# Patient Record
(demographics unavailable — no encounter records)

---

## 2017-02-08 NOTE — REP
LEFT FOOT SERIES:  FOUR VIEWS.

 

HISTORY:  Trauma.

 

FINDINGS:  Four views of the left foot show a nondisplaced fracture through the

calcaneus best seen on oblique radiograph.  Bohler angle is not flattened.  No

other fracture is seen.  Overall mineralization pattern is normal.

 

IMPRESSION:

 

Nondisplaced fracture of the posterior and plantar aspect of the left calcaneus.

 

 

Signed by

Karlos Vargas MD 02/08/2017 03:02 P

## 2017-02-08 NOTE — EDDOCDS
Nurse's Notes                                                                                     

[facility]                                                                         

Name: Ralph Castillo                                                                           

Age: 50 yrs                                                                                       

Sex: Male                                                                                         

: 1966                                                                                   

MRN: [id number]                                                                                     

Arrival Date: 2017                                                                          

Time: 08:32                                                                                       

Account#: [id number]                                                                              

Bed PD                                                                                      

Private MD:                                                                                       

Diagnosis: Nondisplaced fracture of body of left calcaneus;Low back pain                          

                                                                                                  

Presentation:                                                                                     

                                                                                             

08:56 Presenting complaint: Patient states: "Ladder kicked out and I landed on my heel"       mlb1

      reports falling 10 feet onto left heel last night. Adult Sepsis Screening: The patient      

      does not have new or worsening altered mentation. Patient's respiratory rate is less        

      than 22. Systolic blood pressure is greater than 100. Patient has a qSOFA score of 0-       

      Negative Sepsis Screen. Suicide/Homicide risk assessment- the patient denies having any     

      suicidal and/or homicidal ideations and does not present with any other emotional,          

      behavioral or mental health complaints.  Status: Patient is not a service           

      member or  dependent. Transition of care: patient was not received from another     

      setting of care.                                                                            

08:56 Acuity: MARTHA Level 4                                                                     mlb1

08:56 Method Of Arrival: Walkin/Carried/Asstd                                                 mlb1

                                                                                                  

Triage Assessment:                                                                                

08:58 General: Appears in no apparent distress, Behavior is appropriate for age, cooperative. mlb1

      Pain: Location: heel of left foot Pain currently is 5 out of 10 on a pain scale. At         

      worst was 10 out of 10 on a pain scale. Aggravated by weight bearing. Pt Declines HIV       

      testing.                                                                                    

                                                                                                  

Historical:                                                                                       

- Allergies: no known allergies;                                                                  

- Home Meds:                                                                                      

1. Synthroid 150 mcg Oral tab 1 tab once daily                                                  

- PMHx: Hypothyroidism;                                                                           

- PSHx: Tonsillectomy; Hernia repair;                                                             

- Social history: Smoking status: Patient states was never smoker of tobacco. No                  

barriers to communication noted, The patient speaks fluent English, Speaks                      

appropriately for age.                                                                          

- Family history: Not pertinent.                                                                  

- : The pt / caregiver states he / she is not on anticoagulants. Home medication list             

is obtained from the patient.                                                                   

- Exposure Risk Screening:: None identified.                                                      

                                                                                                  

                                                                                                  

Screening:                                                                                        

10:07 Screening information is obtained from the patient. Fall risk: No risks identified.     mlb1

      Assistance ADL's: requires no assistance with activities of daily living. Abuse/DV          

      Screen: The patient / caregiver reports he/she is: not in a situation that causes fear,     

      pain or injury. Nutritional screening: No deficits noted. Advance Directives:               

      Currently, there is no health care proxy. home support is adequate.                         

                                                                                                  

Assessment:                                                                                       

10:06 General: Appears in no apparent distress, Behavior is agitated. Pain: Location: heel of mlb1

      left foot Pain currently is 5 out of 10 on a pain scale. Injury Description: Bruise         

      sustained to arch of left foot is purple.                                                   

10:07 General: Pt refused back x-ray, reported back pain to provider none reported to this    b1

      writer.                                                                                     

                                                                                                  

Vital Signs:                                                                                      

08:58  / 59; Pulse 60; Resp 16; Temp 98.9(TE); Pulse Ox 98% on R/A; Weight 79.38 kg     mlb1

      (R); Height 6 ft. 0 in. (182.88 cm) (R); Pain 5/10;                                         

08:58 Body Mass Index 23.73 (79.38 kg, 182.88 cm)                                             mlb1

                                                                                                  

Vitals:                                                                                           

08:58 Log In Time: 2017 at 08:30.                                                mlb1

                                                                                                  

ED Course:                                                                                        

08:33 Patient visited by Broderick Keenan.                                                    mm15

08:33 Patient moved to Waiting                                                                mm15

08:56 Patient visited by Oz George, YADIEL.                                               mlb1

08:57 Triage Initiated                                                                        mlb1

08:59 Patient visited by Oz George RN.                                               mlb1

08:59 Patient moved to PD2 /                                                                mlb1

09:03 [facility]                                                        ar2 

09:03 Barin Aguilar MD is Attending Physician.                                               ar2 

09:08 Patient visited by Genaro Vargas PA-C.                                             ar2 

10:04 Pako Koo is Referral Physician.                                                     ar2 

10:08 The patient / caregiver is instructed regarding the plan of care and ED course.         mlb1

10:08 No IV's were initiated during this patient's visit. No procedures done that require     mlb1

      assistance.                                                                                 

10:16 [facility] Payment Agreement was scanned into [employer] and attached to record.               mm15

10:23 Patient visited by Oz George, YADIEL.                                               mlb1

                                                                                                  

Order Results:                                                                                    

There are currently no results for this order.                                                    

Outcome:                                                                                          

10:05 Patient left against medical advice.                                                    ar2 

10:18 Discharge Assessment: Patient awake, alert and oriented x 3. No cognitive and/or        mlb1

      functional deficits noted. Patient verbalized understanding of disposition                  

      instructions. patient administered narcotics - no. Condition: good. Discharge               

      instructions given to patient, Instructed on discharge instructions, follow up and          

      referral plans. Demonstrated understanding of instructions, Pt was receptive of             

      discharge instructions/ teaching. No special radiology studies were completed. Property     

      sent home with patient.                                                                     

10:19 The following High Risk Discharge criteria are identified: Yes, Pt leaving AMA for      mlb1

      refusing back x-ray, reported back pain to provider, appointment with Ortho Dr. Koo       

      immediately after departure. The patient is leaving AMA: AMA form signed, Notification      

      of AMA status is made to the charge nurse, the ED attending physician.                      

10:23 Patient left the ED.                                                                    [facility]

                                                                                                  

Signatures:                                                                                       

Oz George RN                   RN   b1                                                 

Genaro Vargas PA-C PA-C ar2                                                  

Broderick Keenan                             mm15                                                 

                                                                                                  

Corrections: (The following items were deleted from the chart)                                    

10:23 10:18 The following High Risk Discharge criteria are identified: None. Discharged to    [facility]

      home ambulatory, with crutches, [facility]                                                        

10:23 10:18 Discharge instructions given to patient, Instructed on discharge instructions,    b1

      follow up and referral plans. Demonstrated understanding of instructions, Pt was            

      receptive of discharge instructions/ teaching. mlb1                                         

                                                                                                  

**************************************************************************************************

MTDD

## 2017-02-08 NOTE — REP
LEFT OS CALCIS, TWO VIEWS:

 

HISTORY:  Fall.

 

There is a defect in the inferior cortex of the calcaneus consistent with a

fracture.  There is no dislocation.  The joint spaces are normal in appearance.

 

 

IMPRESSION:

 

Fracture of the inferior calcaneus.

 

 

Signed by

Jhon Gomez MD 02/08/2017 09:59 A

## 2017-02-08 NOTE — EDDOCDS
Physician Documentation                                                                           

[facility]                                                                         

Name: Ralph Castillo                                                                           

Age: 50 yrs                                                                                       

Sex: Male                                                                                         

: 1966                                                                                   

MRN: [id number]                                                                                     

Arrival Date: 2017                                                                          

Time: 08:32                                                                                       

Account#: [id number]                                                                              

Bed PD                                                                                      

Private MD:                                                                                       

Disposition:                                                                                      

17 10:05 Patient has left against medical advice. Impression: Nondisplaced fracture of      

body of left calcaneus, Low back pain. - Patients states they are going to                      

Home/Self Care.                                                                                 

- Condition is Unknown.                                                                           

- Discharge Instructions: Calcaneal Fracture Repair, Crutch Use.                                  

                                                                                                  

Medication Reconciliation, Local Pharmacy Hours form.                                             

Follow up: Pako Koo; When: go directly to office; Reason: Recheck today's                     

complaints, Continuance of care.                                                                

- Problem is new.                                                                                 

- Symptoms are unchanged.                                                                         

- Notes: Your case was discussed with Dr. Koo. Use crutches, no weight bearing on               

left foot. Go directly to his office from the ER for further evaluation. Please                 

understand you may also have a fracture in your spine for which you refused an xray             

today.                                                                                          

                                                                                                  

                                                                                                  

Historical:                                                                                       

- Allergies: no known allergies;                                                                  

- Home Meds:                                                                                      

1. Synthroid 150 mcg Oral tab 1 tab once daily                                                  

- PMHx: Hypothyroidism;                                                                           

- PSHx: Tonsillectomy; Hernia repair;                                                             

- Social history: Smoking status: Patient states was never smoker of tobacco. No                  

barriers to communication noted, The patient speaks fluent English, Speaks                      

appropriately for age.                                                                          

- Family history: Not pertinent.                                                                  

- : The pt / caregiver states he / she is not on anticoagulants. Home medication list             

is obtained from the patient.                                                                   

- Exposure Risk Screening:: None identified.                                                      

                                                                                                  

                                                                                                  

Vital Signs:                                                                                      

                                                                                             

08:58  / 59; Pulse 60; Resp 16; Temp 98.9(TE); Pulse Ox 98% on R/A; Weight 79.38 kg /   mlb1

      175 lbs (R); Height 6 ft. 0 in. (182.88 cm) (R); Pain 5/10;                                 

08:58 Body Mass Index 23.73 (79.38 kg, 182.88 cm)                                             mlb1

                                                                                                  

MDM:                                                                                              

09:16 Foot, Complete Ordered.                                                                 EDMS

09:16 Calcaneus Ordered.                                                                      EDMS

09:16 Spine. Lumbosacral, Complete Ordered.                                                   EDMS

09:16 NOTHING BY MOUTH+DIET ordered.                                                          EDMS

09:37 Financial registration complete.                                                        mm15

10:16 [facility] Payment Agreement was scanned into [employer] and attached to record.               mm15

                                                                                                  

Signatures:                                                                                       

Dispatcher [employer]                           Oz Talavera RN                   RN   mlb1                                                 

Genaro Vargas PA-C PA-C ar2                                                  

Broderick Keenan                             mm15                                                 

                                                                                                  

The chart was reviewed and I authenticate all verbal orders and agree with the evaluation and 
treatment provided.Attachments:

10:16 [facility] Payment Agreement                                                                mm15

                                                                                                  

**************************************************************************************************

MTDD

## 2017-02-10 NOTE — EDDOCDS
Physician Documentation                                                                           

[facility]                                                                         

Name: Ralph Castillo                                                                           

Age: 50 yrs                                                                                       

Sex: Male                                                                                         

: 1966                                                                                   

MRN: [id number]                                                                                     

Arrival Date: 2017                                                                          

Time: 08:32                                                                                       

Account#: [id number]                                                                              

Bed PD                                                                                      

Private MD:                                                                                       

Disposition:                                                                                      

17 10:05 Patient has left against medical advice. Impression: Nondisplaced fracture of      

body of left calcaneus, Low back pain. - Patients states they are going to                      

Home/Self Care.                                                                                 

- Condition is Unknown.                                                                           

- Discharge Instructions: Calcaneal Fracture Repair, Crutch Use.                                  

                                                                                                  

Medication Reconciliation, Local Pharmacy Hours form.                                             

Follow up: Pako Koo; When: go directly to office; Reason: Recheck today's                     

complaints, Continuance of care.                                                                

- Problem is new.                                                                                 

- Symptoms are unchanged.                                                                         

- Notes: Your case was discussed with Dr. Koo. Use crutches, no weight bearing on               

left foot. Go directly to his office from the ER for further evaluation. Please                 

understand you may also have a fracture in your spine for which you refused an xray             

today.                                                                                          

                                                                                                  

                                                                                                  

Historical:                                                                                       

- Allergies: no known allergies;                                                                  

- Home Meds:                                                                                      

1. Synthroid 150 mcg Oral tab 1 tab once daily                                                  

- PMHx: Hypothyroidism;                                                                           

- PSHx: Tonsillectomy; Hernia repair;                                                             

- Social history: Smoking status: Patient states was never smoker of tobacco. No                  

barriers to communication noted, The patient speaks fluent English, Speaks                      

appropriately for age.                                                                          

- Family history: Not pertinent.                                                                  

- : The pt / caregiver states he / she is not on anticoagulants. Home medication list             

is obtained from the patient.                                                                   

- Exposure Risk Screening:: None identified.                                                      

                                                                                                  

                                                                                                  

Vital Signs:                                                                                      

                                                                                             

08:58  / 59; Pulse 60; Resp 16; Temp 98.9(TE); Pulse Ox 98% on R/A; Weight 79.38 kg /   mlb1

      175 lbs (R); Height 6 ft. 0 in. (182.88 cm) (R); Pain 5/10;                                 

08:58 Body Mass Index 23.73 (79.38 kg, 182.88 cm)                                             mlb1

                                                                                                  

MDM:                                                                                              

09:16 Foot, Complete Ordered.                                                                 EDMS

09:16 Calcaneus Ordered.                                                                      EDMS

09:16 Spine. Lumbosacral, Complete Ordered.                                                   EDMS

09:16 NOTHING BY MOUTH+DIET ordered.                                                          EDMS

09:37 Financial registration complete.                                                        mm15

10:16 [facility] Payment Agreement was scanned into [employer] and attached to record.               mm15

                                                                                             

10:10 Refusal of Services was scanned into [employer] and attached to record.                    gb  

10:10 T-Sheet-- Draft Copy was scanned into [employer] and attached to record.                   gb  

10:10 Radiology Report was scanned into [employer] and attached to record.                       gb  

                                                                                                  

Signatures:                                                                                       

Dispatcher [employer]                           EDMS                                                 

Rosario Troncoso, Reg                  Reg  gb                                                   

Oz George RN                   RN   mlb1                                                 

Genaro Vargas PA-C                 PAELSIE ar2                                                  

Broderick Keenan                             mm15                                                 

                                                                                                  

The chart was reviewed and I authenticate all verbal orders and agree with the evaluation and 
treatment provided.Attachments:

                                                                                             

10:16 [facility] Payment Agreement                                                                mm15

10:10 T-Sheet-- Draft Copy                                                                    gb  

                                                                                                  

**************************************************************************************************



*** Chart Complete ***
MTDD

## 2017-02-10 NOTE — EDDOCDS
Physician Documentation                                                                           

[facility]                                                                         

Name: Ralph Castillo                                                                           

Age: 50 yrs                                                                                       

Sex: Male                                                                                         

: 1966                                                                                   

MRN: [id number]                                                                                     

Arrival Date: 2017                                                                          

Time: 08:32                                                                                       

Account#: [id number]                                                                              

Bed PD                                                                                      

Private MD:                                                                                       

Disposition:                                                                                      

17 10:05 Patient has left against medical advice. Impression: Nondisplaced fracture of      

body of left calcaneus, Low back pain. - Patients states they are going to                      

Home/Self Care.                                                                                 

- Condition is Unknown.                                                                           

- Discharge Instructions: Calcaneal Fracture Repair, Crutch Use.                                  

                                                                                                  

Medication Reconciliation, Local Pharmacy Hours form.                                             

Follow up: Pako Koo; When: go directly to office; Reason: Recheck today's                     

complaints, Continuance of care.                                                                

- Problem is new.                                                                                 

- Symptoms are unchanged.                                                                         

- Notes: Your case was discussed with Dr. Koo. Use crutches, no weight bearing on               

left foot. Go directly to his office from the ER for further evaluation. Please                 

understand you may also have a fracture in your spine for which you refused an xray             

today.                                                                                          

                                                                                                  

                                                                                                  

Historical:                                                                                       

- Allergies: no known allergies;                                                                  

- Home Meds:                                                                                      

1. Synthroid 150 mcg Oral tab 1 tab once daily                                                  

- PMHx: Hypothyroidism;                                                                           

- PSHx: Tonsillectomy; Hernia repair;                                                             

- Social history: Smoking status: Patient states was never smoker of tobacco. No                  

barriers to communication noted, The patient speaks fluent English, Speaks                      

appropriately for age.                                                                          

- Family history: Not pertinent.                                                                  

- : The pt / caregiver states he / she is not on anticoagulants. Home medication list             

is obtained from the patient.                                                                   

- Exposure Risk Screening:: None identified.                                                      

                                                                                                  

                                                                                                  

Vital Signs:                                                                                      

                                                                                             

08:58  / 59; Pulse 60; Resp 16; Temp 98.9(TE); Pulse Ox 98% on R/A; Weight 79.38 kg /   mlb1

      175 lbs (R); Height 6 ft. 0 in. (182.88 cm) (R); Pain 5/10;                                 

08:58 Body Mass Index 23.73 (79.38 kg, 182.88 cm)                                             mlb1

                                                                                                  

MDM:                                                                                              

09:16 Foot, Complete Ordered.                                                                 EDMS

09:16 Calcaneus Ordered.                                                                      EDMS

09:16 Spine. Lumbosacral, Complete Ordered.                                                   EDMS

09:16 NOTHING BY MOUTH+DIET ordered.                                                          EDMS

09:37 Financial registration complete.                                                        mm15

10:16 [facility] Payment Agreement was scanned into [employer] and attached to record.               mm15

                                                                                             

10:10 Refusal of Services was scanned into [employer] and attached to record.                    gb  

10:10 T-Sheet-- Draft Copy was scanned into [employer] and attached to record.                   gb  

10:10 Radiology Report was scanned into [employer] and attached to record.                       gb  

                                                                                                  

Signatures:                                                                                       

Dispatcher [employer]                           EDMS                                                 

Rosario Troncoso, Reg                  Reg  gb                                                   

Oz George RN                   RN   mlb1                                                 

Genaro Vargas PA-C                 PAELSIE ar2                                                  

Broderick Keenan                             mm15                                                 

                                                                                                  

The chart was reviewed and I authenticate all verbal orders and agree with the evaluation and 
treatment provided.Attachments:

                                                                                             

10:16 [facility] Payment Agreement                                                                mm15

10:10 T-Sheet-- Draft Copy                                                                    gb  

                                                                                                  

**************************************************************************************************



*** Chart Complete ***
MTDD

## 2017-02-10 NOTE — EDDOCDS
Nurse's Notes                                                                                     

[facility]                                                                         

Name: Ralph Castillo                                                                           

Age: 50 yrs                                                                                       

Sex: Male                                                                                         

: 1966                                                                                   

MRN: [id number]                                                                                     

Arrival Date: 2017                                                                          

Time: 08:32                                                                                       

Account#: [id number]                                                                              

Bed PD                                                                                      

Private MD:                                                                                       

Diagnosis: Nondisplaced fracture of body of left calcaneus;Low back pain                          

                                                                                                  

Presentation:                                                                                     

                                                                                             

08:56 Presenting complaint: Patient states: "Ladder kicked out and I landed on my heel"       mlb1

      reports falling 10 feet onto left heel last night. Adult Sepsis Screening: The patient      

      does not have new or worsening altered mentation. Patient's respiratory rate is less        

      than 22. Systolic blood pressure is greater than 100. Patient has a qSOFA score of 0-       

      Negative Sepsis Screen. Suicide/Homicide risk assessment- the patient denies having any     

      suicidal and/or homicidal ideations and does not present with any other emotional,          

      behavioral or mental health complaints.  Status: Patient is not a service           

      member or  dependent. Transition of care: patient was not received from another     

      setting of care.                                                                            

08:56 Acuity: MARTHA Level 4                                                                     mlb1

08:56 Method Of Arrival: Walkin/Carried/Asstd                                                 mlb1

                                                                                                  

Triage Assessment:                                                                                

08:58 General: Appears in no apparent distress, Behavior is appropriate for age, cooperative. mlb1

      Pain: Location: heel of left foot Pain currently is 5 out of 10 on a pain scale. At         

      worst was 10 out of 10 on a pain scale. Aggravated by weight bearing. Pt Declines HIV       

      testing.                                                                                    

                                                                                                  

Historical:                                                                                       

- Allergies: no known allergies;                                                                  

- Home Meds:                                                                                      

1. Synthroid 150 mcg Oral tab 1 tab once daily                                                  

- PMHx: Hypothyroidism;                                                                           

- PSHx: Tonsillectomy; Hernia repair;                                                             

- Social history: Smoking status: Patient states was never smoker of tobacco. No                  

barriers to communication noted, The patient speaks fluent English, Speaks                      

appropriately for age.                                                                          

- Family history: Not pertinent.                                                                  

- : The pt / caregiver states he / she is not on anticoagulants. Home medication list             

is obtained from the patient.                                                                   

- Exposure Risk Screening:: None identified.                                                      

                                                                                                  

                                                                                                  

Screening:                                                                                        

10:07 Screening information is obtained from the patient. Fall risk: No risks identified.     mlb1

      Assistance ADL's: requires no assistance with activities of daily living. Abuse/DV          

      Screen: The patient / caregiver reports he/she is: not in a situation that causes fear,     

      pain or injury. Nutritional screening: No deficits noted. Advance Directives:               

      Currently, there is no health care proxy. home support is adequate.                         

                                                                                                  

Assessment:                                                                                       

10:06 General: Appears in no apparent distress, Behavior is agitated. Pain: Location: heel of mlb1

      left foot Pain currently is 5 out of 10 on a pain scale. Injury Description: Bruise         

      sustained to arch of left foot is purple.                                                   

10:07 General: Pt refused back x-ray, reported back pain to provider none reported to this    [facility]

      writer.                                                                                     

                                                                                                  

Vital Signs:                                                                                      

08:58  / 59; Pulse 60; Resp 16; Temp 98.9(TE); Pulse Ox 98% on R/A; Weight 79.38 kg     mlb1

      (R); Height 6 ft. 0 in. (182.88 cm) (R); Pain 5/10;                                         

08:58 Body Mass Index 23.73 (79.38 kg, 182.88 cm)                                             mlb1

                                                                                                  

Vitals:                                                                                           

08:58 Log In Time: 2017 at 08:30.                                                mlb1

                                                                                                  

ED Course:                                                                                        

08:33 Patient visited by Broderick Keenan.                                                    mm15

08:33 Patient moved to Waiting                                                                mm15

08:56 Patient visited by Oz George, YADIEL.                                               mlb1

08:57 Triage Initiated                                                                        mlb1

08:59 Patient visited by Oz George RN.                                               mlb1

08:59 Patient moved to PD2 /                                                                mlb1

09:03 Genaro Vargas PA-C is PHCP.                                                        ar2 

09:03 Brain Aguilar MD is Attending Physician.                                               ar2 

09:08 Patient visited by Genaro Vargas PA-C.                                             ar2 

10:04 Pako Koo is Referral Physician.                                                     ar2 

10:08 The patient / caregiver is instructed regarding the plan of care and ED course.         mlb1

10:08 No IV's were initiated during this patient's visit. No procedures done that require     mlb1

      assistance.                                                                                 

10:16 [facility] Payment Agreement was scanned into [employer] and attached to record.               mm15

10:23 Patient visited by Oz George, YADIEL.                                               mlb1

10:34 Calcaneus Returned.                                                                     EDMS

10:34 Foot, Complete Returned.                                                                EDMS

                                                                                             

10:10 Refusal of Services was scanned into [employer] and attached to record.                    gb  

10:10 T-Sheet-- Draft Copy was scanned into [employer] and attached to record.                   gb  

10:10 Radiology Report was scanned into [employer] and attached to record.                       gb  

                                                                                                  

Attachments:                                                                                      

                                                                                             

10:10 Refusal of Services                                                                     gb  

                                                                                                  

Order Results:                                                                                    

                                                                                                  

Radiology Order: Foot, Complete                                                                   

      Test: Foot, Complete                                                                        

      REASON FOR EXAMINATION: trauma, fall from ladder; LEFT FOOT SERIES: FOUR VIEWS.; ;          

      HISTORY: Trauma.; ; FINDINGS: Four views of the left foot show a nondisplaced fracture      

      through the; calcaneus best seen on oblique radiograph. Bohler angle is not flattened.      

      No; other fracture is seen. Overall mineralization pattern is normal.; ; IMPRESSION:; ;     

      Nondisplaced fracture of the posterior and plantar aspect of the left calcaneus.; ; ;       

      Signed by; Karlos Vargas MD 2017 03:02 P;                                              

Radiology Order: Calcaneus                                                                        

      Test: Calcaneus                                                                             

      REASON FOR EXAMINATION: fall from ladder; LEFT OS CALCIS, TWO VIEWS:; ; HISTORY: Fall.;     

      ; There is a defect in the inferior cortex of the calcaneus consistent with a;              

      fracture. There is no dislocation. The joint spaces are normal in appearance.; ; ;          

      IMPRESSION:; ; Fracture of the inferior calcaneus.; ; ; Signed by; Jhon Gomez MD         

      2017 09:59 A;                                                                         

Outcome:                                                                                          

                                                                                             

10:05 Patient left against medical advice.                                                    ar2 

10:18 Discharge Assessment: Patient awake, alert and oriented x 3. No cognitive and/or        mlb1

      functional deficits noted. Patient verbalized understanding of disposition                  

      instructions. patient administered narcotics - no. Condition: good. Discharge               

      instructions given to patient, Instructed on discharge instructions, follow up and          

      referral plans. Demonstrated understanding of instructions, Pt was receptive of             

      discharge instructions/ teaching. No special radiology studies were completed. Property     

      sent home with patient.                                                                     

10:19 The following High Risk Discharge criteria are identified: Yes, Pt leaving AMA for      mlb1

      refusing back x-ray, reported back pain to provider, appointment with Ortho Dr. Koo       

      immediately after departure. The patient is leaving AMA: AMA form signed, Notification      

      of AMA status is made to the charge nurse, the ED attending physician.                      

10:23 Patient left the ED.                                                                    mlb1

                                                                                                  

Signatures:                                                                                       

Dispatcher [employer]                           EDMS                                                 

Rosario Troncoso, Reg                  Reg  gb                                                   

Vanderpool, Oz B, RN                   RN   mlb1                                                 

Genaro Vargas PA-C                 PAELSIE ar2                                                  

Broderick Keenan                             mm15                                                 

                                                                                                  

Corrections: (The following items were deleted from the chart)                                    

10:23 10:18 The following High Risk Discharge criteria are identified: None. Discharged to    [facility]

      home ambulatory, with crutches, [facility]                                                        

10:23 10:18 Discharge instructions given to patient, Instructed on discharge instructions,    [facility]

      follow up and referral plans. Demonstrated understanding of instructions, Pt was            

      receptive of discharge instructions/ teaching. [facility]                                         

                                                                                                  

**************************************************************************************************



*** Chart Complete ***
MTDD

## 2017-02-21 NOTE — EDDOCDS
Physician Documentation                                                                           

[facility]                                                                         

Name: Ralph Castillo                                                                           

Age: 50 yrs                                                                                       

Sex: Male                                                                                         

: 1966                                                                                   

MRN: [id number]                                                                                     

Arrival Date: 2017                                                                          

Time: 23:52                                                                                       

Account#: [id number]                                                                              

Bed 1                                                                                             

Private MD: Unknown Pcp                                                                           

Disposition:                                                                                      

17 00:43 Discharged to Home/Self Care. Impression: Generalized anxiety disorder.            

- Condition is Stable.                                                                            

- Discharge Instructions: Panic Attacks, Generalized Anxiety Disorder.                            

                                                                                                  

- Medication Reconciliation, Local Pharmacy Hours form.                                           

- Follow up: Referral List; When: Call to arrange an appointment; Reason: Continuance             

of care.                                                                                        

- Problem is an acute exacerbation.                                                               

- Symptoms have improved.                                                                         

                                                                                                  

                                                                                                  

                                                                                                  

Historical:                                                                                       

- Allergies: No known drug Allergies;                                                             

- Home Meds:                                                                                      

1. Synthroid 150 mcg Oral tab 1 tab once daily                                                  

- PMHx: Hypothyroidism;                                                                           

- PSHx: Tonsillectomy; Hernia repair;                                                             

- Social history: Smoking status: Patient states was never smoker of tobacco. Patient             

uses alcohol street drugs, No barriers to communication noted, The patient speaks               

fluent English, Speaks appropriately for age.                                                   

- Family history: No immediate family members are acutely ill.                                    

- : The pt / caregiver states he / she is not on anticoagulants. Home medication list             

is obtained from the patient.                                                                   

- Exposure Risk Screening:: None identified.                                                      

                                                                                                  

                                                                                                  

Vital Signs:                                                                                      

                                                                                             

00:02  / 88; Pulse 64; Resp 20; Pulse Ox 100% on R/A; Weight 83.46 kg / 184 lbs (M);    nn1 

      Height 6 ft. 0 in. (182.88 cm); Pain 0/10;                                                  

00:02 Body Mass Index 24.95 (83.46 kg, 182.88 cm)                                             nn1 

                                                                                                  

Signatures:                                                                                       

Harish Gaitan DO                    DO   mm11                                                 

Kristin Ochoa,RN                        RN   nn1                                                  

                                                                                                  

**************************************************************************************************

DANIELD

## 2017-02-21 NOTE — EDDOCDS
Nurse's Notes                                                                                     

[facility]                                                                         

Name: Ralph Castillo                                                                           

Age: 50 yrs                                                                                       

Sex: Male                                                                                         

: 1966                                                                                   

MRN: [id number]                                                                                     

Arrival Date: 2017                                                                          

Time: 23:52                                                                                       

Account#: [id number]                                                                              

Bed 1                                                                                             

Private MD: Unknown Pcp                                                                           

Diagnosis: Generalized anxiety disorder                                                           

                                                                                                  

Presentation:                                                                                     

                                                                                             

23:55 Presenting complaint: EMS states: mother called EMS due to patient having trouble       nn1 

      breathing, reported possible intoxication. Upon PD arrival patient was face down,           

      unresponsive. Patient alert and oriented at this time. Mental Health Triage Level:          

      Level 2: The patient was brought to the ED for evaluation because of a legal pickup         

      order.  Status: Patient is not a  or  dependent.              

      Transition of care: patient was not received from another setting of care.                  

23:55 Acuity: MARTHA Level 3                                                                     nn1 

23:55 Method Of Arrival: Ambulance                                                            nn1 

23:55 Adult Sepsis Screening: The patient does not have new or worsening altered mentation.   nn1 

      Patient's respiratory rate is less than 22. Systolic blood pressure is greater than         

      100. Patient has a qSOFA score of 0- Negative Sepsis Screen.                                

23:57 Suicide/Homicide risk assessment- the patient denies having any suicidal and/or         nn1 

      homicidal ideations and does not present with any other emotional, behavioral or mental     

      health complaints.                                                                          

                                                                                                  

Triage Assessment:                                                                                

23:56 General: Appears distressed, Behavior is agitated, anxious. Pain: Denies pain. HIV      nn1 

      screening NA for this visit Offered previously. The patient is triaged at the bedside.      

      See Assessment in Nurses Notes section of ED record. Neurological: Level of                 

      Consciousness is awake, alert, obeys commands. Respiratory: Airway is patent                

      Respiratory effort is even, unlabored, Respiratory pattern is regular, symmetrical.         

      Derm: Skin is pink, warm & dry.                                                           

                                                                                                  

Historical:                                                                                       

- Allergies: No known drug Allergies;                                                             

- Home Meds:                                                                                      

1. Synthroid 150 mcg Oral tab 1 tab once daily                                                  

- PMHx: Hypothyroidism;                                                                           

- PSHx: Tonsillectomy; Hernia repair;                                                             

- Social history: Smoking status: Patient states was never smoker of tobacco. Patient             

uses alcohol street drugs, No barriers to communication noted, The patient speaks               

fluent English, Speaks appropriately for age.                                                   

- Family history: No immediate family members are acutely ill.                                    

- : The pt / caregiver states he / she is not on anticoagulants. Home medication list             

is obtained from the patient.                                                                   

- Exposure Risk Screening:: None identified.                                                      

                                                                                                  

                                                                                                  

Screenin                                                                                             

00:47 Screening information is obtained from the patient. Fall risk: No risks identified.     nn1 

      Assistance ADL's: requires no assistance with activities of daily living. Abuse/DV          

      Screen: The patient / caregiver reports he/she is: not in a situation that causes fear,     

      pain or injury. Nutritional screening: No deficits noted. Advance Directives:               

      Currently, there is no health care proxy. home support is adequate.                         

                                                                                                  

Assessment:                                                                                       

00:04 General: Appears in no apparent distress, Behavior is cooperative, Patient cooperative  nn1 

      at this time. Patient released from handcuffs.. Pain: Denies pain. Neurological: Level      

      of Consciousness is awake, alert, obeys commands, Oriented to person, place, time.          

      Respiratory: Airway is patent Respiratory effort is even, unlabored, Respiratory            

      pattern is regular, symmetrical. GI: Abdomen is non- distended Derm: Skin is pink, warm     

      & dry.                                                                                    

00:20 General: Patient became very anxious when asked to change out of personal clothes.      nn1 

      Patient states "everyone is laughing at me, i feel humiliated". Patient privacy             

      maintained. Provider aware of patient anxieties at this time. Patient in no respiratory     

      distress, reports he was having anxiety attack when respiratory difficulties presented.     

      .                                                                                           

00:47 Reassessment: Patient appears in no apparent distress at this time.                     nn1 

                                                                                                  

Vital Signs:                                                                                      

00:02  / 88; Pulse 64; Resp 20; Pulse Ox 100% on R/A; Weight 83.46 kg (M); Height 6 ft. nn1 

      0 in. (182.88 cm); Pain 0/10;                                                               

00:02 Body Mass Index 24.95 (83.46 kg, 182.88 cm)                                             nn1 

                                                                                                  

Vitals:                                                                                           

0220                                                                                             

23:58 Log In Time N/A - ambulance arrival.                                                    nn1 

                                                                                                  

ED Course:                                                                                        

23:53 Patient visited by Renetta Mendez PCA.                                                tmm1

23:53 Unknown Pcp is Private Physician.                                                       tmm1

23:53 Nikki Rosado,RN is Primary Nurse.                                                 tmm1

23:53 Patient moved to Waiting                                                                tmm1

23:53 Patient moved to 1                                                                      tmm1

23:56 Triage Initiated                                                                        nn1 

                                                                                             

00:34 Harish Gaitan DO is Attending Physician.                                            mm11

00:34 Patient visited by Harish Gaitan DO.                                                mm11

00:42 Patient visited by Harish Gaitan DO.                                                mm11

00:43 Referral List is Referral Physician.                                                    mm11

00:46 Primary Nurse role handed off by Nikki Rosado,YADIEL                                  sls1

00:48 The patient / caregiver is instructed regarding the plan of care and ED course.         nn1 

00:48 No IV's were initiated during this patient's visit. No procedures done that require     nn1 

      assistance.                                                                                 

                                                                                                  

Order Results:                                                                                    

There are currently no results for this order.                                                    

Outcome:                                                                                          

00:43 Discharge ordered by Provider.                                                          mm11

00:48 Discharge Assessment: Patient awake, alert and oriented x 3. No cognitive and/or        nn1 

      functional deficits noted. Patient verbalized understanding of disposition                  

      instructions. patient administered narcotics - no. The following High Risk Discharge        

      criteria are identified: None. Discharged to home ambulatory. Condition: unchanged. No      

      special radiology studies were completed. Property :Personal belongings accompany Pt.       

01:04 Patient left the ED.                                                                    nn1 

                                                                                                  

Signatures:                                                                                       

Harish Gaitan DO DO   mm11                                                 

Tamera Monsalve RN                     RN   sls1                                                 

Renetta Mendez, ED                    PCA  tmm1                                                 

Kristin Ochoa,RN                        RN   nn1                                                  

                                                                                                  

**************************************************************************************************

BRETT

## 2017-02-23 NOTE — EDDOCDS
Physician Documentation                                                                           

[facility]                                                                         

Name: Ralph Castillo                                                                           

Age: 50 yrs                                                                                       

Sex: Male                                                                                         

: 1966                                                                                   

MRN: [id number]                                                                                     

Arrival Date: 2017                                                                          

Time: 23:52                                                                                       

Account#: [id number]                                                                              

Bed 1                                                                                             

Private MD: Unknown Pcp                                                                           

Disposition:                                                                                      

17 00:43 Discharged to Home/Self Care. Impression: Generalized anxiety disorder.            

- Condition is Stable.                                                                            

- Discharge Instructions: Panic Attacks, Generalized Anxiety Disorder.                            

                                                                                                  

- Medication Reconciliation, Local Pharmacy Hours form.                                           

- Follow up: Referral List; When: Call to arrange an appointment; Reason: Continuance             

of care.                                                                                        

- Problem is an acute exacerbation.                                                               

- Symptoms have improved.                                                                         

                                                                                                  

                                                                                                  

                                                                                                  

Historical:                                                                                       

- Allergies: No known drug Allergies;                                                             

- Home Meds:                                                                                      

1. Synthroid 150 mcg Oral tab 1 tab once daily                                                  

- PMHx: Hypothyroidism;                                                                           

- PSHx: Tonsillectomy; Hernia repair;                                                             

- Social history: Smoking status: Patient states was never smoker of tobacco. Patient             

uses alcohol street drugs, No barriers to communication noted, The patient speaks               

fluent English, Speaks appropriately for age.                                                   

- Family history: No immediate family members are acutely ill.                                    

- : The pt / caregiver states he / she is not on anticoagulants. Home medication list             

is obtained from the patient.                                                                   

- Exposure Risk Screening:: None identified.                                                      

                                                                                                  

                                                                                                  

Vital Signs:                                                                                      

                                                                                             

00:02  / 88; Pulse 64; Resp 20; Pulse Ox 100% on R/A; Weight 83.46 kg / 184 lbs (M);    nn1 

      Height 6 ft. 0 in. (182.88 cm); Pain 0/10;                                                  

00:02 Body Mass Index 24.95 (83.46 kg, 182.88 cm)                                             nn1 

                                                                                                  

MDM:                                                                                              

01:56 Financial registration complete.                                                        [facility] 

09:29 T-Sheet-- Draft Copy was scanned into [employer] and attached to record.                     

09:29 PCR was scanned into [employer] and attached to record.                                      

                                                                                                  

Signatures:                                                                                       

Rosario Troncoso, Reg                  Reg  Harish Pal DO                    DO   mm11                                                 

Nickie Colin                                 [facility]                                                  

Kristin Ochoa,RN                        RN   nn1                                                  

                                                                                                  

The chart was reviewed and I authenticate all verbal orders and agree with the evaluation and 
treatment provided.Attachments:

09:29 T-Sheet-- Draft Copy                                                                      

                                                                                                  

**************************************************************************************************



*** Chart Complete ***
MTDD

## 2017-02-23 NOTE — EDDOCDS
Physician Documentation                                                                           

[facility]                                                                         

Name: Ralph Castillo                                                                           

Age: 50 yrs                                                                                       

Sex: Male                                                                                         

: 1966                                                                                   

MRN: [id number]                                                                                     

Arrival Date: 2017                                                                          

Time: 23:52                                                                                       

Account#: [id number]                                                                              

Bed 1                                                                                             

Private MD: Unknown Pcp                                                                           

Disposition:                                                                                      

17 00:43 Discharged to Home/Self Care. Impression: Generalized anxiety disorder.            

- Condition is Stable.                                                                            

- Discharge Instructions: Panic Attacks, Generalized Anxiety Disorder.                            

                                                                                                  

- Medication Reconciliation, Local Pharmacy Hours form.                                           

- Follow up: Referral List; When: Call to arrange an appointment; Reason: Continuance             

of care.                                                                                        

- Problem is an acute exacerbation.                                                               

- Symptoms have improved.                                                                         

                                                                                                  

                                                                                                  

                                                                                                  

Historical:                                                                                       

- Allergies: No known drug Allergies;                                                             

- Home Meds:                                                                                      

1. Synthroid 150 mcg Oral tab 1 tab once daily                                                  

- PMHx: Hypothyroidism;                                                                           

- PSHx: Tonsillectomy; Hernia repair;                                                             

- Social history: Smoking status: Patient states was never smoker of tobacco. Patient             

uses alcohol street drugs, No barriers to communication noted, The patient speaks               

fluent English, Speaks appropriately for age.                                                   

- Family history: No immediate family members are acutely ill.                                    

- : The pt / caregiver states he / she is not on anticoagulants. Home medication list             

is obtained from the patient.                                                                   

- Exposure Risk Screening:: None identified.                                                      

                                                                                                  

                                                                                                  

Vital Signs:                                                                                      

                                                                                             

00:02  / 88; Pulse 64; Resp 20; Pulse Ox 100% on R/A; Weight 83.46 kg / 184 lbs (M);    nn1 

      Height 6 ft. 0 in. (182.88 cm); Pain 0/10;                                                  

00:02 Body Mass Index 24.95 (83.46 kg, 182.88 cm)                                             nn1 

                                                                                                  

MDM:                                                                                              

01:56 Financial registration complete.                                                        [facility] 

09:29 T-Sheet-- Draft Copy was scanned into [employer] and attached to record.                     

09:29 PCR was scanned into [employer] and attached to record.                                      

                                                                                                  

Signatures:                                                                                       

Rosario Troncoso, Reg                  Reg  Harish Pal DO                    DO   mm11                                                 

Nickie Colin                                 [facility]                                                  

Kristin Ochoa,RN                        RN   nn1                                                  

                                                                                                  

The chart was reviewed and I authenticate all verbal orders and agree with the evaluation and 
treatment provided.Attachments:

09:29 T-Sheet-- Draft Copy                                                                      

                                                                                                  

**************************************************************************************************



*** Chart Complete ***
MTDD

## 2017-02-23 NOTE — EDDOCDS
Nurse's Notes                                                                                     

[facility]                                                                         

Name: Ralph Castillo                                                                           

Age: 50 yrs                                                                                       

Sex: Male                                                                                         

: 1966                                                                                   

MRN: [id number]                                                                                     

Arrival Date: 2017                                                                          

Time: 23:52                                                                                       

Account#: [id number]                                                                              

Bed 1                                                                                             

Private MD: Unknown Pcp                                                                           

Diagnosis: Generalized anxiety disorder                                                           

                                                                                                  

Presentation:                                                                                     

                                                                                             

23:55 Presenting complaint: EMS states: mother called EMS due to patient having trouble       nn1 

      breathing, reported possible intoxication. Upon PD arrival patient was face down,           

      unresponsive. Patient alert and oriented at this time. Mental Health Triage Level:          

      Level 2: The patient was brought to the ED for evaluation because of a legal pickup         

      order.  Status: Patient is not a  or  dependent.              

      Transition of care: patient was not received from another setting of care.                  

23:55 Acuity: MARTHA Level 3                                                                     nn1 

23:55 Method Of Arrival: Ambulance                                                            nn1 

23:55 Adult Sepsis Screening: The patient does not have new or worsening altered mentation.   nn1 

      Patient's respiratory rate is less than 22. Systolic blood pressure is greater than         

      100. Patient has a qSOFA score of 0- Negative Sepsis Screen.                                

23:57 Suicide/Homicide risk assessment- the patient denies having any suicidal and/or         nn1 

      homicidal ideations and does not present with any other emotional, behavioral or mental     

      health complaints.                                                                          

                                                                                                  

Triage Assessment:                                                                                

23:56 General: Appears distressed, Behavior is agitated, anxious. Pain: Denies pain. HIV      nn1 

      screening NA for this visit Offered previously. The patient is triaged at the bedside.      

      See Assessment in Nurses Notes section of ED record. Neurological: Level of                 

      Consciousness is awake, alert, obeys commands. Respiratory: Airway is patent                

      Respiratory effort is even, unlabored, Respiratory pattern is regular, symmetrical.         

      Derm: Skin is pink, warm & dry.                                                           

                                                                                                  

Historical:                                                                                       

- Allergies: No known drug Allergies;                                                             

- Home Meds:                                                                                      

1. Synthroid 150 mcg Oral tab 1 tab once daily                                                  

- PMHx: Hypothyroidism;                                                                           

- PSHx: Tonsillectomy; Hernia repair;                                                             

- Social history: Smoking status: Patient states was never smoker of tobacco. Patient             

uses alcohol street drugs, No barriers to communication noted, The patient speaks               

fluent English, Speaks appropriately for age.                                                   

- Family history: No immediate family members are acutely ill.                                    

- : The pt / caregiver states he / she is not on anticoagulants. Home medication list             

is obtained from the patient.                                                                   

- Exposure Risk Screening:: None identified.                                                      

                                                                                                  

                                                                                                  

Screenin                                                                                             

00:47 Screening information is obtained from the patient. Fall risk: No risks identified.     nn1 

      Assistance ADL's: requires no assistance with activities of daily living. Abuse/DV          

      Screen: The patient / caregiver reports he/she is: not in a situation that causes fear,     

      pain or injury. Nutritional screening: No deficits noted. Advance Directives:               

      Currently, there is no health care proxy. home support is adequate.                         

                                                                                                  

Assessment:                                                                                       

00:04 General: Appears in no apparent distress, Behavior is cooperative, Patient cooperative  nn1 

      at this time. Patient released from handcuffs.. Pain: Denies pain. Neurological: Level      

      of Consciousness is awake, alert, obeys commands, Oriented to person, place, time.          

      Respiratory: Airway is patent Respiratory effort is even, unlabored, Respiratory            

      pattern is regular, symmetrical. GI: Abdomen is non- distended Derm: Skin is pink, warm     

      & dry.                                                                                    

00:20 General: Patient became very anxious when asked to change out of personal clothes.      nn1 

      Patient states "everyone is laughing at me, i feel humiliated". Patient privacy             

      maintained. Provider aware of patient anxieties at this time. Patient in no respiratory     

      distress, reports he was having anxiety attack when respiratory difficulties presented.     

      .                                                                                           

00:47 Reassessment: Patient appears in no apparent distress at this time.                     nn1 

                                                                                                  

Vital Signs:                                                                                      

00:02  / 88; Pulse 64; Resp 20; Pulse Ox 100% on R/A; Weight 83.46 kg (M); Height 6 ft. nn1 

      0 in. (182.88 cm); Pain 0/10;                                                               

00:02 Body Mass Index 24.95 (83.46 kg, 182.88 cm)                                             nn1 

                                                                                                  

Vitals:                                                                                           

0220                                                                                             

23:58 Log In Time N/A - ambulance arrival.                                                    nn1 

                                                                                                  

ED Course:                                                                                        

23:53 Patient visited by Renetta Mendez PCA.                                                tmm1

23:53 Unknown Pcp is Private Physician.                                                       tmm1

23:53 Nikki Rosado,RN is Primary Nurse.                                                 tmm1

23:53 Patient moved to Waiting                                                                tmm1

23:53 Patient moved to 1                                                                      tmm1

23:56 Triage Initiated                                                                        nn1 

                                                                                             

00:34 Harish Gaitan DO is Attending Physician.                                            mm11

00:34 Patient visited by Harish Gaitan DO.                                                mm11

00:42 Patient visited by Harish Gaitan DO.                                                mm11

00:43 Referral List is Referral Physician.                                                    mm11

00:46 Primary Nurse role handed off by Nikki Rosado,RN                                  sls1

00:48 The patient / caregiver is instructed regarding the plan of care and ED course.         nn1 

00:48 No IV's were initiated during this patient's visit. No procedures done that require     nn1 

      assistance.                                                                                 

09:29 T-Sheet-- Draft Copy was scanned into [employer] and attached to record.                   gb  

09:29 PCR was scanned into [employer] and attached to record.                                    gb  

                                                                                                  

Order Results:                                                                                    

There are currently no results for this order.                                                    

Outcome:                                                                                          

00:43 Discharge ordered by Provider.                                                          mm11

00:48 Discharge Assessment: Patient awake, alert and oriented x 3. No cognitive and/or        nn1 

      functional deficits noted. Patient verbalized understanding of disposition                  

      instructions. patient administered narcotics - no. The following High Risk Discharge        

      criteria are identified: None. Discharged to home ambulatory. Condition: unchanged. No      

      special radiology studies were completed. Property :Personal belongings accompany Pt.       

01:04 Patient left the ED.                                                                    nn1 

                                                                                                  

Signatures:                                                                                       

Rosario Troncoso, Reg                  Reg  gb                                                   

Harish Gaitan DO                    DO   mm11                                                 

Tamera Monsalve RN                     RN   sls1                                                 

Renetta Mendez, ED                    PCA  tmm1                                                 

Kristin Ochoa,RN                        RN   nn1                                                  

                                                                                                  

**************************************************************************************************



*** Chart Complete ***
MTDD